# Patient Record
Sex: MALE | URBAN - METROPOLITAN AREA
[De-identification: names, ages, dates, MRNs, and addresses within clinical notes are randomized per-mention and may not be internally consistent; named-entity substitution may affect disease eponyms.]

---

## 2018-04-17 ENCOUNTER — TELEPHONE (OUTPATIENT)
Dept: FAMILY MEDICINE CLINIC | Facility: CLINIC | Age: 43
End: 2018-04-17

## 2018-04-17 NOTE — TELEPHONE ENCOUNTER
PT WIFE CALLED AND SAID HER  IS TAKING 2 500 MG METFORMIN TWICE A DAY AND ITS MAKE HER  STOMACH UNCOMFORTABLE   WANTS TO KNOW WHAT HE CAN DO ABOUT IT  PLEASE CALL PT WIFE SINCE HER  CANT ANSWER Rosemarie Araujo AT WORK [phone] ([phone])

## 2023-04-28 ENCOUNTER — TELEPHONE (OUTPATIENT)
Dept: FAMILY MEDICINE CLINIC | Facility: CLINIC | Age: 48
End: 2023-04-28

## 2023-04-30 ENCOUNTER — PATIENT OUTREACH (OUTPATIENT)
Dept: CASE MANAGEMENT | Age: 48
End: 2023-04-30

## 2023-04-30 NOTE — PROCEDURES
The office order for PCP removal request is Approved and finalized on April 30, 2023.     Thanks,  [facility] [employer]